# Patient Record
Sex: FEMALE | Race: WHITE
[De-identification: names, ages, dates, MRNs, and addresses within clinical notes are randomized per-mention and may not be internally consistent; named-entity substitution may affect disease eponyms.]

---

## 2018-10-23 ENCOUNTER — HOSPITAL ENCOUNTER (OUTPATIENT)
Age: 33
Setting detail: OBSERVATION
Discharge: HOME | End: 2018-10-23

## 2018-10-23 ENCOUNTER — HOSPITAL ENCOUNTER (OUTPATIENT)
Dept: HOSPITAL 91 - L-D | Age: 33
Setting detail: OBSERVATION
Discharge: HOME | End: 2018-10-23
Payer: MEDICAID

## 2018-10-23 DIAGNOSIS — O26.893: Primary | ICD-10-CM

## 2018-10-23 DIAGNOSIS — Z3A.28: ICD-10-CM

## 2018-10-23 PROCEDURE — 99217: CPT

## 2018-10-23 PROCEDURE — 76817 TRANSVAGINAL US OBSTETRIC: CPT

## 2019-01-04 ENCOUNTER — HOSPITAL ENCOUNTER (INPATIENT)
Dept: HOSPITAL 91 - OBT | Age: 34
LOS: 2 days | Discharge: HOME | End: 2019-01-06
Payer: MEDICAID

## 2019-01-04 ENCOUNTER — HOSPITAL ENCOUNTER (INPATIENT)
Dept: HOSPITAL 10 - L-D | Age: 34
LOS: 2 days | Discharge: HOME | End: 2019-01-06
Attending: OBSTETRICS & GYNECOLOGY | Admitting: OBSTETRICS & GYNECOLOGY
Payer: MEDICAID

## 2019-01-04 VITALS — RESPIRATION RATE: 18 BRPM | SYSTOLIC BLOOD PRESSURE: 102 MMHG | DIASTOLIC BLOOD PRESSURE: 59 MMHG | HEART RATE: 67 BPM

## 2019-01-04 VITALS — HEART RATE: 75 BPM | RESPIRATION RATE: 18 BRPM | SYSTOLIC BLOOD PRESSURE: 99 MMHG | DIASTOLIC BLOOD PRESSURE: 55 MMHG

## 2019-01-04 VITALS — RESPIRATION RATE: 20 BRPM | HEART RATE: 72 BPM | SYSTOLIC BLOOD PRESSURE: 90 MMHG | DIASTOLIC BLOOD PRESSURE: 58 MMHG

## 2019-01-04 VITALS
BODY MASS INDEX: 32.13 KG/M2 | HEIGHT: 62 IN | HEIGHT: 62 IN | BODY MASS INDEX: 32.13 KG/M2 | WEIGHT: 174.61 LBS | WEIGHT: 174.61 LBS

## 2019-01-04 VITALS — DIASTOLIC BLOOD PRESSURE: 49 MMHG | SYSTOLIC BLOOD PRESSURE: 86 MMHG | RESPIRATION RATE: 18 BRPM | HEART RATE: 78 BPM

## 2019-01-04 DIAGNOSIS — Z3A.37: ICD-10-CM

## 2019-01-04 LAB
ADD MAN DIFF?: NO
BASOPHIL #: 0 10^3/UL (ref 0–0.1)
BASOPHILS %: 0.2 % (ref 0–2)
EOSINOPHILS #: 0.1 10^3/UL (ref 0–0.5)
EOSINOPHILS %: 0.7 % (ref 0–7)
HEMATOCRIT: 33.5 % (ref 37–47)
HEMOGLOBIN: 11.4 G/DL (ref 12–16)
IMMATURE GRANS #M: 0.03 10^3/UL (ref 0–0.03)
IMMATURE GRANS % (M): 0.3 % (ref 0–0.43)
INR: 0.96
LYMPHOCYTES #: 2.9 10^3/UL (ref 0.8–2.9)
LYMPHOCYTES %: 33.2 % (ref 15–51)
MEAN CORPUSCULAR HEMOGLOBIN: 26.8 PG (ref 29–33)
MEAN CORPUSCULAR HGB CONC: 34 G/DL (ref 32–37)
MEAN CORPUSCULAR VOLUME: 78.8 FL (ref 82–101)
MEAN PLATELET VOLUME: 11.9 FL (ref 7.4–10.4)
MONOCYTE #: 0.6 10^3/UL (ref 0.3–0.9)
MONOCYTES %: 7.1 % (ref 0–11)
NEUTROPHIL #: 5 10^3/UL (ref 1.6–7.5)
NEUTROPHILS %: 58.5 % (ref 39–77)
NUCLEATED RED BLOOD CELLS #: 0 10^3/UL (ref 0–0)
NUCLEATED RED BLOOD CELLS%: 0 /100WBC (ref 0–0)
PARTIAL THROMBOPLASTIN TIME: 26.9 SEC (ref 23–35)
PLATELET COUNT: 167 10^3/UL (ref 140–415)
POSITIVE DIFF: (no result)
PROTIME: 12.9 SEC (ref 11.9–14.9)
PT RATIO: 1
RAPID PLASMA REAGIN: NONREACTIVE
RED BLOOD COUNT: 4.25 10^6/UL (ref 4.2–5.4)
RED CELL DISTRIBUTION WIDTH: 14.1 % (ref 11.5–14.5)
WHITE BLOOD COUNT: 8.6 10^3/UL (ref 4.8–10.8)

## 2019-01-04 PROCEDURE — 85025 COMPLETE CBC W/AUTO DIFF WBC: CPT

## 2019-01-04 PROCEDURE — 85730 THROMBOPLASTIN TIME PARTIAL: CPT

## 2019-01-04 PROCEDURE — G0463 HOSPITAL OUTPT CLINIC VISIT: HCPCS

## 2019-01-04 PROCEDURE — 62319: CPT

## 2019-01-04 PROCEDURE — 85610 PROTHROMBIN TIME: CPT

## 2019-01-04 PROCEDURE — 86850 RBC ANTIBODY SCREEN: CPT

## 2019-01-04 PROCEDURE — 86592 SYPHILIS TEST NON-TREP QUAL: CPT

## 2019-01-04 PROCEDURE — 86900 BLOOD TYPING SEROLOGIC ABO: CPT

## 2019-01-04 PROCEDURE — 86901 BLOOD TYPING SEROLOGIC RH(D): CPT

## 2019-01-04 RX ADMIN — IBUPROFEN 1 MG: 600 TABLET ORAL at 18:20

## 2019-01-04 RX ADMIN — METHYLERGONOVINE MALEATE 1 MG: 0.2 INJECTION, SOLUTION INTRAMUSCULAR; INTRAVENOUS at 09:16

## 2019-01-04 RX ADMIN — IBUPROFEN SCH MG: 600 TABLET ORAL at 12:00

## 2019-01-04 RX ADMIN — OXYCODONE HYDROCHLORIDE AND ASPIRIN 1 TAB: 4.8355; 325 TABLET ORAL at 13:41

## 2019-01-04 RX ADMIN — Medication 56 SPRAY: at 13:42

## 2019-01-04 RX ADMIN — Medication PRN APPLIC: at 13:42

## 2019-01-04 RX ADMIN — PYRIDOXINE HYDROCHLORIDE 1 MLS/HR: 100 INJECTION, SOLUTION INTRAMUSCULAR; INTRAVENOUS at 07:46

## 2019-01-04 RX ADMIN — Medication 1 MLS/HR: at 09:22

## 2019-01-04 RX ADMIN — Medication 1 APPLIC: at 13:42

## 2019-01-04 RX ADMIN — IBUPROFEN SCH MG: 600 TABLET ORAL at 18:20

## 2019-01-04 RX ADMIN — DOCUSATE SODIUM AND SENNOSIDES 1 TAB: 8.6; 5 TABLET, FILM COATED ORAL at 21:08

## 2019-01-04 RX ADMIN — DOCUSATE SODIUM AND SENNOSIDES SCH TAB: 8.6; 5 TABLET, FILM COATED ORAL at 21:08

## 2019-01-04 RX ADMIN — IBUPROFEN 1 MG: 600 TABLET ORAL at 12:00

## 2019-01-04 RX ADMIN — OXYCODONE HYDROCHLORIDE AND ASPIRIN PRN TAB: 4.8355; 325 TABLET ORAL at 13:41

## 2019-01-04 RX ADMIN — Medication 1 MLS/HR: at 09:43

## 2019-01-04 RX ADMIN — WITCH HAZEL 40 PAD: 500 SOLUTION RECTAL; TOPICAL at 13:42

## 2019-01-04 RX ADMIN — Medication PRN SPRAY: at 13:42

## 2019-01-04 RX ADMIN — AMPICILLIN 1 MLS/HR: 2 INJECTION, POWDER, FOR SOLUTION INTRAVENOUS at 07:46

## 2019-01-04 RX ADMIN — Medication 1 MLS/HR: at 09:19

## 2019-01-04 NOTE — PAC
Date/Time of Note


Date/Time of Note


DATE: 1/4/19 


TIME: 08:23





Post-Anesthesia Notes


Post-Anesthesia Note


Last documented vital signs





Vital Signs


  Date      Temp  Pulse  Resp  B/P (MAP)  Pulse Ox  O2          O2 Flow     FiO2


Time                                                Delivery    Rate


    1/4/19  98.0     91    18      97/60        99


     07:23





Activity:  WNL


Respiratory function:  WNL


Cardiovascular function:  WNL


Mental status:  Baseline


Pain reasonably controlled:  Yes


Hydration appropriate:  Yes


Nausea/Vomiting absent:  No











CHRISTY BLOUNT MD             Jan 4, 2019 08:24

## 2019-01-04 NOTE — PREAC
Date/Time of Note


Date/Time of Note


DATE: 1/4/19 


TIME: 07:57





Anesthesia Eval and Record


Evaluation


Time Pre-Procedure Interview


DATE: 1/4/19 


TIME: 07:57


Age


33


Sex


female


NPO:  8 hrs


Preoperative diagnosis


labor pain


Planned procedure


epidural





Past Medical History


Past Medical History:  None





Surgery & Anesthesia Issues


No known issue





Meds


Anticoagulation:  No


Beta Blocker within 24 hr:  No


Reason Beta Blocker not given:  Pt. not on B-Blocker


Reported Medications


Ferrous Sulfate (Iron) 325 Mg Capsr, 325 MG PO


   6/27/16


Calcium Carbonate (CALCIUM) 600 Mg Tablet, 600 MG PO, TAB


   6/27/16


Fish Oil/Borage/Flax/Om3,6,9#1 (OMEGA 3-6-9 COMPLEX SOFTGEL) 400 Mg Capsule, 400


MG PO, CAP


   6/27/16


Multivit/Min/Fol Ac/Iron/Pren* (Prenatal S*) 1 Tab Tab, 1 TAB PO DAILY, TAB


   6/27/16





Current Medications


Lactated Ringer's 1,000 ml @  125 mls/hr Q8H IV  Last administered on 1/4/19at 


07:46; Admin Dose 125 MLS/HR;  Start 1/4/19 at 07:24


Butorphanol Tartrate (Stadol) 2 mg Q2H  PRN IV PAIN;  Start 1/4/19 at 07:30


Lidocaine (Xylocaine 1% (Mpf)) 30 ml ONCE PRN INJ EPISIOTOMY;  Start 1/4/19 at 


07:30


Oxytocin/Lactated Ringer's 500 ml @  500 mls/hr ONCE POST PARTUM IV ;  Start 


1/4/19 at 07:30


Oxytocin/Lactated Ringer's 500 ml @  125 mls/hr POST PARTUM IV ;  Start 1/4/19 


at 07:30


Oxytocin/Lactated Ringer's 500 ml @ 0 mls/hr ONCE PRN IV VAGINAL BLEEDING;  


Start 1/4/19 at 07:30


Methylergonovine Maleate (Methergine) 0.2 mg ONCE PRN IM VAGINAL BLEEDING;  


Start 1/4/19 at 07:30


Carboprost Tromethamine (Hemabate) 250 mcg ONCE PRN IM VAGINAL BLEEDING;  Start 


1/4/19 at 07:30


Misoprostol (Cytotec) 1,000 mcg ONCE PRN AL VAGINAL BLEEDING;  Start 1/4/19 at 


07:30


Ampicillin 50 ml @  100 mls/hr Q4 IVPB ;  Start 1/4/19 at 13:00


Ampicillin 100 ml @  100 mls/hr ONCE  ONCE IVPB  Last administered on 1/4/19at 


07:46; Admin Dose 100 MLS/HR;  Start 1/4/19 at 07:30;  Stop 1/4/19 at 08:29


Meds reviewed:  Yes





Allergies


Coded Allergies:  


     No Known Drug Allergies (Verified  Allergy, Unknown, 6/27/16)


Allergies Reviewed:  Yes





Labs/Studies


Labs Reviewed:  Reviewed by anesthesiologist


Result Diagram:  


1/4/19 0735





Laboratory Tests


1/4/19 07:35











Blood Bank


                   Test
                         1/4/19
07:35


                   Rh Immune Globulin Candidate  NO





Pregnancy test:  Positive


Studies:  ECG (n/a), CXR (n/a)





Pre-procedure Exam


Last vitals





Vital Signs


  Date      Temp  Pulse  Resp  B/P (MAP)  Pulse Ox  O2          O2 Flow     FiO2


Time                                                Delivery    Rate


    1/4/19  98.0


     07:23





Airway:  Adequate mouth opening


Mallampati:  Mallampati I


Teeth:  Normal


Lung:  Normal


Heart:  Normal





ASA Physical Status


ASA physical status:  2


Emergency:  None





Planned Anesthetic


Neuraxial:  Epidural





Planned Pain Management


Epidural





Pre-operative Attestations


Prior to commencing anesthesia and surgery, the patient was re-evaluated, there 


was verification of:


*The patient's identity


*The results of appropriate recent lab work and preoperative vital signs


*The above evaluation not changing prior to induction


*Anesthetic plan, risk benefits, alternative and complications discussed with 


patient/family; questions answered; patient/family understands, accepts and 


wishes to proceed.











CHRISTY BLOUNT MD             Jan 4, 2019 07:58

## 2019-01-04 NOTE — LDN
Date/Time of Note


Date/Time of Note


DATE: 1/4/19 


TIME: 09:18





Delivery Summary


Weeks of Gestation


37


Placenta Delivered:  Spontaneously


Meconium:  none


Episiotomy:  No


Estimated blood loss:  300


Sponge & Needle done & correct:  Yes


All needle counts correct:  Yes


Any foreign bodies felt in the:  No





Infant Delivery Information


Sex


Infant Sex:  male





Apgars


1 Minute:  9


5 Minute:  9





Suctioning


Nose & mouth suctioned at hillary:  No


Delee suction performed:  No





Umbilical Cord


Umbilical cord with:  3 Vessels


Cord presentations:  no nuchal cord


Nuchal cord present X:  0


Cord Blood was obtained:  Yes











YULIANA WILLS MD            Jan 4, 2019 09:19

## 2019-01-04 NOTE — TRIAGE
===================================

OB Triage

===================================

Datetime Report Generated by CPN: 01/04/2019 07:54

   

   

===========================

Datetime: 01/04/2019 07:48

===========================

   

   

===================================

Vaginal Exam

===================================

   

 Dilatation (cms):  8.0

 Effacement (%):  90

 Station:  -1

 Exam By:  byobe

   

===========================

Datetime: 01/04/2019 07:39

===========================

   

   

===================================

Vaginal Exam

===================================

   

 Dilatation (cms):  7.0

 Effacement (%):  90

 Station:  -2

 Exam By:  byobe

 Membrane Status:  Bulging

   

===========================

Datetime: 01/04/2019 07:33

===========================

   

 Time of Arrival:  01/04/2019 07:33

 EGA:  37.2

 Arrived By:  Ambulatory

 Arrived From:  Home

 Chief Complaint:  UC'S 

 Fetal Movement:  Present

 Time Contractions Began:  01/04/2019 03:00

 Rupture of Membranes:  Denies

 Vaginal Bleeding:  Small

 Vaginal Discharge:  Denies

 Recent Sexual Intercouse:  Denies

 Abdominal Trauma:  Not Applicable

 Patient Complaints:  Contractions

 Time Provider Notified:  01/04/2019 07:50

 Provider Notified:  DR. WILLS

   

===========================

Datetime: 10/23/2018 16:42

===========================

   

   

===================================

Fall Risk Assessment

===================================

   

 Fall Score:  0

 Fall Risk Score Definition:  No Risk: No action required

   

===========================

Datetime: 10/23/2018 15:30

===========================

   

   

===================================

Fall Risk Assessment

===================================

   

 Fall Score:  0

 Fall Risk Score Definition:  No Risk: No action required

   

===========================

Datetime: 10/23/2018 15:04

===========================

   

 EGA:  26.6

## 2019-01-04 NOTE — PREOPHP
DATE OF ADMISSION: 2019

 

HISTORY OF PRESENT ILLNESS:  Ms. Ortiz is a 33-year-old  2, para 1, EDC 2019, intraut
erine pregnancy at term, presented to triage in labor.  She has been complaining of contractions alexi
y this morning.  She denies any vaginal bleeding or discharge.  Her prenatal care took place with Dr. Dougherty at [facility].

 

PAST MEDICAL HISTORY:  None.

 

MEDICATIONS:  Prenatal vitamins.

 

PAST SURGICAL HISTORY:  None.

 

OBSTETRIC HISTORY:  Primigravida.

 

GYNECOLOGIC HISTORY:  13, regular 3 to 4 days.  Denies any sexually transmitted disease.  Sexually ac
tive with 1 partner.

 

SOCIAL HISTORY:  Denies any smoking, drugs or alcohol.

 

FAMILY HISTORY:  None.

 

REVIEW OF SYSTEMS:  All within normal except history of present illness.

 

PHYSICAL EXAMINATION:

HEENT:  Within normal.

LUNGS:  CTA bilateral.

CARDIOVASCULAR:  S1, S2, regular rhythm.

ABDOMEN:  Gravid, nontender.  Negative CVA bilateral.

EXTREMITIES:  Negative.  No calf tenderness.

PELVIC:  Vaginal exam anterior lip, 100% effaced, 0 station, intact.  Fetal heart tracing category 1.
  [place] regular contractions.

 

ASSESSMENT:  Intrauterine pregnancy at term, in labor.

 

PLAN:  Anticipated vaginal delivery.  The patient currently receiving epidural for pain management.

 

 

Dictated By: YULIANA CHEEMA/JAI

DD:    2019 08:13:04

DT:    2019 08:31:55

Conf#: [id number]

DID#:  [id number]

## 2019-01-05 VITALS — HEART RATE: 88 BPM | SYSTOLIC BLOOD PRESSURE: 89 MMHG | DIASTOLIC BLOOD PRESSURE: 54 MMHG | RESPIRATION RATE: 18 BRPM

## 2019-01-05 VITALS — DIASTOLIC BLOOD PRESSURE: 51 MMHG | HEART RATE: 77 BPM | RESPIRATION RATE: 19 BRPM | SYSTOLIC BLOOD PRESSURE: 90 MMHG

## 2019-01-05 VITALS — RESPIRATION RATE: 18 BRPM | DIASTOLIC BLOOD PRESSURE: 53 MMHG | HEART RATE: 75 BPM | SYSTOLIC BLOOD PRESSURE: 88 MMHG

## 2019-01-05 VITALS — RESPIRATION RATE: 18 BRPM | SYSTOLIC BLOOD PRESSURE: 94 MMHG | HEART RATE: 85 BPM | DIASTOLIC BLOOD PRESSURE: 59 MMHG

## 2019-01-05 LAB
ADD MAN DIFF?: NO
BASOPHIL #: 0 10^3/UL (ref 0–0.1)
BASOPHILS %: 0.2 % (ref 0–2)
EOSINOPHILS #: 0.1 10^3/UL (ref 0–0.5)
EOSINOPHILS %: 1 % (ref 0–7)
HEMATOCRIT: 29 % (ref 37–47)
HEMOGLOBIN: 9.6 G/DL (ref 12–16)
IMMATURE GRANS #M: 0.04 10^3/UL (ref 0–0.03)
IMMATURE GRANS % (M): 0.4 % (ref 0–0.43)
LYMPHOCYTES #: 3 10^3/UL (ref 0.8–2.9)
LYMPHOCYTES %: 29.7 % (ref 15–51)
MEAN CORPUSCULAR HEMOGLOBIN: 26.3 PG (ref 29–33)
MEAN CORPUSCULAR HGB CONC: 33.1 G/DL (ref 32–37)
MEAN CORPUSCULAR VOLUME: 79.5 FL (ref 82–101)
MEAN PLATELET VOLUME: 11 FL (ref 7.4–10.4)
MONOCYTE #: 0.6 10^3/UL (ref 0.3–0.9)
MONOCYTES %: 6.4 % (ref 0–11)
NEUTROPHIL #: 6.2 10^3/UL (ref 1.6–7.5)
NEUTROPHILS %: 62.3 % (ref 39–77)
NUCLEATED RED BLOOD CELLS #: 0 10^3/UL (ref 0–0)
NUCLEATED RED BLOOD CELLS%: 0 /100WBC (ref 0–0)
PLATELET COUNT: 167 10^3/UL (ref 140–415)
RED BLOOD COUNT: 3.65 10^6/UL (ref 4.2–5.4)
RED CELL DISTRIBUTION WIDTH: 14.3 % (ref 11.5–14.5)
WHITE BLOOD COUNT: 9.9 10^3/UL (ref 4.8–10.8)

## 2019-01-05 RX ADMIN — IBUPROFEN 1 MG: 600 TABLET ORAL at 17:38

## 2019-01-05 RX ADMIN — IBUPROFEN 1 MG: 600 TABLET ORAL at 00:00

## 2019-01-05 RX ADMIN — IBUPROFEN 1 MG: 600 TABLET ORAL at 11:41

## 2019-01-05 RX ADMIN — IBUPROFEN SCH MG: 600 TABLET ORAL at 06:54

## 2019-01-05 RX ADMIN — Medication PRN APPLIC: at 11:41

## 2019-01-05 RX ADMIN — Medication 56 SPRAY: at 05:35

## 2019-01-05 RX ADMIN — DOCUSATE SODIUM AND SENNOSIDES SCH TAB: 8.6; 5 TABLET, FILM COATED ORAL at 21:33

## 2019-01-05 RX ADMIN — IBUPROFEN 1 MG: 600 TABLET ORAL at 06:54

## 2019-01-05 RX ADMIN — IBUPROFEN SCH MG: 600 TABLET ORAL at 17:38

## 2019-01-05 RX ADMIN — IBUPROFEN SCH MG: 600 TABLET ORAL at 11:41

## 2019-01-05 RX ADMIN — OXYCODONE HYDROCHLORIDE AND ASPIRIN 1 TAB: 4.8355; 325 TABLET ORAL at 05:28

## 2019-01-05 RX ADMIN — DOCUSATE SODIUM AND SENNOSIDES 1 TAB: 8.6; 5 TABLET, FILM COATED ORAL at 21:33

## 2019-01-05 RX ADMIN — DOCUSATE SODIUM AND SENNOSIDES SCH TAB: 8.6; 5 TABLET, FILM COATED ORAL at 09:59

## 2019-01-05 RX ADMIN — IBUPROFEN 1 MG: 600 TABLET ORAL at 01:37

## 2019-01-05 RX ADMIN — OXYCODONE HYDROCHLORIDE AND ASPIRIN PRN TAB: 4.8355; 325 TABLET ORAL at 05:28

## 2019-01-05 RX ADMIN — IBUPROFEN SCH MG: 600 TABLET ORAL at 00:00

## 2019-01-05 RX ADMIN — IBUPROFEN SCH MG: 600 TABLET ORAL at 01:37

## 2019-01-05 RX ADMIN — WITCH HAZEL 40 PAD: 500 SOLUTION RECTAL; TOPICAL at 05:35

## 2019-01-05 RX ADMIN — DOCUSATE SODIUM AND SENNOSIDES 1 TAB: 8.6; 5 TABLET, FILM COATED ORAL at 09:59

## 2019-01-05 RX ADMIN — Medication PRN SPRAY: at 05:35

## 2019-01-05 RX ADMIN — Medication 1 APPLIC: at 11:41

## 2019-01-05 NOTE — PD.PPDC
OB/GYN Discharge Instruction


Condition


                 [id number]
Patient Condition:  [id number]
Good








Diet


                [id number]
Diet:  [id number]
Resume Regular Diet








Activity/Restrictions


                [id number]
Activity:  [id number]
Normal Activity


                                      May Shower








Follow-up


Follow-up with Physician:  3, Week/Weeks





Return to clinic for


      [id number]
GYN Instructions:       [id number]
Fever greater than 101


                                         Chills


                                         Worsening abdominal pain


                                         Excessive Vaginal Bleeding


                                         More than 2 pads per hour


                                         Unable to tolerate diet


      [id number]
OB Instructions:        [id number]
Breast Tenderness


                                         Depression


                                         Blurried Vision


                                         Headache


      [id number]
Surgical Instructions:  [id number]
Incisional Drainage


                                         Incisional Redness














YULIANA WILLS MD            Jan 5, 2019 19:17

## 2019-01-05 NOTE — DS
Date/Time of Note


Date/Time of Note


DATE: 1/5/19 


TIME: 19:19





Obstetrical Discharge Record


Final Diagnosis


Final Diagnosis:  Term delivered





Vaginal Delivery


Obstetrical Delivery:  Spontaneous





Condition on Discharge


Physical Assessment


Last Vitals:


stable afebrile


Voiding:  Yes


Bowel Movement:  Yes


Breast:  Soft, non-tender, Filling


Fundus:  Firm


Calf Tenderness:  No


Patient Condition:  Fair











YULIANA WILLS MD            Jan 5, 2019 19:20

## 2019-01-06 VITALS — RESPIRATION RATE: 14 BRPM | DIASTOLIC BLOOD PRESSURE: 60 MMHG | HEART RATE: 89 BPM | SYSTOLIC BLOOD PRESSURE: 94 MMHG

## 2019-01-06 VITALS — SYSTOLIC BLOOD PRESSURE: 100 MMHG | DIASTOLIC BLOOD PRESSURE: 57 MMHG | HEART RATE: 84 BPM | RESPIRATION RATE: 18 BRPM

## 2019-01-06 RX ADMIN — IBUPROFEN SCH MG: 600 TABLET ORAL at 06:27

## 2019-01-06 RX ADMIN — IBUPROFEN 1 MG: 600 TABLET ORAL at 06:27

## 2019-01-06 RX ADMIN — DOCUSATE SODIUM AND SENNOSIDES SCH TAB: 8.6; 5 TABLET, FILM COATED ORAL at 08:41

## 2019-01-06 RX ADMIN — Medication PRN APPLIC: at 08:41

## 2019-01-06 RX ADMIN — DOCUSATE SODIUM AND SENNOSIDES 1 TAB: 8.6; 5 TABLET, FILM COATED ORAL at 08:41

## 2019-01-06 RX ADMIN — Medication 1 APPLIC: at 08:41
